# Patient Record
(demographics unavailable — no encounter records)

---

## 2025-07-08 NOTE — HISTORY OF PRESENT ILLNESS
[FreeTextEntry1] : Lives in [place] History of increases A1C   [de-identified] : Not much exercise  Surgery as outlined Calcium score 0  2012

## 2025-07-08 NOTE — ASSESSMENT
[Vaccines Reviewed] : Immunizations reviewed today. Please see immunization details in the vaccine log within the immunization flowsheet.  [FreeTextEntry1] : Normal exam  Labs pending